# Patient Record
Sex: MALE | Race: WHITE | Employment: UNEMPLOYED | URBAN - METROPOLITAN AREA
[De-identification: names, ages, dates, MRNs, and addresses within clinical notes are randomized per-mention and may not be internally consistent; named-entity substitution may affect disease eponyms.]

---

## 2021-02-15 ENCOUNTER — OFFICE VISIT (OUTPATIENT)
Dept: INFECTIOUS DISEASES | Age: 36
End: 2021-02-15
Payer: MEDICARE

## 2021-02-15 VITALS
TEMPERATURE: 98.6 F | HEART RATE: 81 BPM | DIASTOLIC BLOOD PRESSURE: 87 MMHG | HEIGHT: 69 IN | OXYGEN SATURATION: 99 % | SYSTOLIC BLOOD PRESSURE: 137 MMHG | WEIGHT: 139.6 LBS | BODY MASS INDEX: 20.68 KG/M2 | RESPIRATION RATE: 20 BRPM

## 2021-02-15 DIAGNOSIS — A69.20 LYME DISEASE: Primary | ICD-10-CM

## 2021-02-15 PROCEDURE — G8484 FLU IMMUNIZE NO ADMIN: HCPCS | Performed by: INTERNAL MEDICINE

## 2021-02-15 PROCEDURE — G8427 DOCREV CUR MEDS BY ELIG CLIN: HCPCS | Performed by: INTERNAL MEDICINE

## 2021-02-15 PROCEDURE — 99204 OFFICE O/P NEW MOD 45 MIN: CPT | Performed by: INTERNAL MEDICINE

## 2021-02-15 PROCEDURE — G8420 CALC BMI NORM PARAMETERS: HCPCS | Performed by: INTERNAL MEDICINE

## 2021-02-15 PROCEDURE — 1036F TOBACCO NON-USER: CPT | Performed by: INTERNAL MEDICINE

## 2021-02-15 RX ORDER — AMOXICILLIN 500 MG/1
500 CAPSULE ORAL 3 TIMES DAILY
Qty: 63 CAPSULE | Refills: 0 | Status: SHIPPED | OUTPATIENT
Start: 2021-02-15 | End: 2021-03-01

## 2021-02-15 RX ORDER — ALBUTEROL SULFATE 90 UG/1
AEROSOL, METERED RESPIRATORY (INHALATION)
COMMUNITY
Start: 2020-04-05

## 2021-02-15 RX ORDER — GUAIFENESIN 600 MG/1
TABLET, EXTENDED RELEASE ORAL
COMMUNITY
Start: 2020-12-18

## 2021-02-15 RX ORDER — ALPRAZOLAM 1 MG/1
1 TABLET ORAL
COMMUNITY
Start: 2020-10-02

## 2021-02-15 RX ORDER — SOLIFENACIN SUCCINATE 5 MG/1
TABLET, FILM COATED ORAL
COMMUNITY
Start: 2020-10-02 | End: 2021-08-09

## 2021-02-15 RX ORDER — POLYETHYLENE GLYCOL 3350 17 G/17G
POWDER, FOR SOLUTION ORAL
COMMUNITY
Start: 2020-11-04

## 2021-02-15 RX ORDER — PANTOPRAZOLE SODIUM 40 MG/1
TABLET, DELAYED RELEASE ORAL
COMMUNITY

## 2021-02-15 RX ORDER — PYRIDOSTIGMINE BROMIDE 60 MG/1
TABLET ORAL
COMMUNITY
Start: 2020-07-21

## 2021-02-15 RX ORDER — BUPROPION HYDROCHLORIDE 150 MG/1
150 TABLET, EXTENDED RELEASE ORAL
COMMUNITY
Start: 2020-09-17

## 2021-02-15 RX ORDER — GABAPENTIN 600 MG/1
600 TABLET ORAL 3 TIMES DAILY
COMMUNITY
Start: 2021-01-29

## 2021-02-15 RX ORDER — TAMSULOSIN HYDROCHLORIDE 0.4 MG/1
CAPSULE ORAL
COMMUNITY
Start: 2020-07-22

## 2021-02-15 ASSESSMENT — ENCOUNTER SYMPTOMS
EYE ITCHING: 0
COLOR CHANGE: 0
ABDOMINAL PAIN: 0
APNEA: 0

## 2021-02-15 NOTE — PROGRESS NOTES
[facility] - Initial Consult Note  Today's Date: 2/15/2021    Impression :   2/15/21 :  · Lyme disease by Pos titers  1/4/2021  · Exposed to Deer, no tick or pamela hx- fatigue increased x 2 mon, WB IGG and SUKHDEEP IGM- ?? Real  Or false - never treated -  · 2/15/21 amoxicillin 21 days  · HOLLOWAY disease 2019 - pacemaker - - labile BP - weak - fogginess - fatigue - wt loss 20 pds    Recommendations   · Amoxicillin 21 days   · Repeat lyme in 45 days  · See me 45 days   Diagnosis Orders   1. Lyme disease  amoxicillin (AMOXIL) 500 MG capsule    Lyme Ab    Lyme Disease, Western Blot       Return in about 6 weeks (around 3/29/2021). History of Present Illness:   Munira Payton is a 38y.o.-year-old  male who presents with   Chief Complaint   Patient presents with    New Patient     stated hasnt noticed any rash doesnt know how he got lyme disease   visit new pt -2/15/21  · Lyme disease by Pos titers  1/4/2021  · Exposed to Deer, no tick or pamela hx- fatigue increased x 2 mon, WB IGG and SUKHDEEP IGM- ?? Real  Or false - never treated -  · 2/15/21 amoxicillin 21 days  · HOLLOWAY disease 2019 - pacemaker - - labile BP - weak - fogginess - fatigue - wt loss 20 pds    No neck pain or HA or joint pains   No heart murmur    Plan:  · Amoxicillin 21 days   · Repeat lyme in 45 days  · See me 45 days      I have personally reviewed the past medical history, past surgical history, medications, social history, and family history, and I haveupdated the database accordingly.   Past Medical History:     Past Medical History:   Diagnosis Date    Asthma     Depression     Lyme disease     Rectal bleeding        Past Surgical  History:     Past Surgical History:   Procedure Laterality Date    APPENDECTOMY      GALLBLADDER SURGERY      HERNIA REPAIR      HERNIA REPAIR      PACEMAKER CHANGE         Medications:     Current Outpatient Medications:     ALPRAZolam (XANAX) 1 MG tablet, Take 1 mg by mouth., Disp: , Rfl:     buPROPion (WELLBUTRIN SR) 150 MG extended release tablet, Take 150 mg by mouth, Disp: , Rfl:     pyridostigmine (MESTINON) 60 MG tablet, pyridostigmine bromide 60 mg tablet  TAKE 2 TABLETS BY MOUTH THREE TIMES A DAY, Disp: , Rfl:     pantoprazole (PROTONIX) 40 MG tablet, pantoprazole 40 mg tablet,delayed release, Disp: , Rfl:     solifenacin (VESICARE) 5 MG tablet, solifenacin 5 mg tablet, Disp: , Rfl:     tamsulosin (FLOMAX) 0.4 MG capsule, tamsulosin 0.4 mg capsule  Take 1 capsule every day by oral route for 90 days. , Disp: , Rfl:     trimethobenzamide (TIGAN) 300 MG capsule, Tigan 300 mg capsule  Take 1 capsule every 8 hours by oral route as needed. , Disp: , Rfl:     polyethylene glycol (GLYCOLAX) 17 GM/SCOOP powder, polyethylene glycol 3350 17 gram oral powder packet, Disp: , Rfl:     gabapentin (NEURONTIN) 600 MG tablet, Take 600 mg by mouth 3 times daily. , Disp: , Rfl:     albuterol sulfate  (90 Base) MCG/ACT inhaler, albuterol sulfate HFA 90 mcg/actuation aerosol inhaler  PRn, Disp: , Rfl:     guaiFENesin (MUCINEX) 600 MG extended release tablet, Mucinex 600 mg tablet, extended release, Disp: , Rfl:     amoxicillin (AMOXIL) 500 MG capsule, Take 1 capsule by mouth 3 times daily for 21 days, Disp: 63 capsule, Rfl: 0    polyethylene glycol (GOLYTELY) 236 G solution, Use as directed for bowel prep, Disp: 4000 mL, Rfl: 0    docusate sodium (COLACE) 100 MG capsule, Take 1 capsule by mouth 2 times daily as needed for Constipation. , Disp: 60 capsule, Rfl: 3    amitriptyline (ELAVIL) 25 MG tablet, Take 25 mg by mouth nightly., Disp: , Rfl:       Social History:     Social History     Socioeconomic History    Marital status: Single     Spouse name: Not on file    Number of children: Not on file    Years of education: Not on file    Highest education level: Not on file   Occupational History    Not on file   Social Needs    Financial resource strain: Not on file   [place] insecurity     Worry: Not on file     Inability: Not on file    Transportation needs     Medical: Not on file     Non-medical: Not on file   Tobacco Use    Smoking status: Former Smoker     Packs/day: 1.00     Types: Cigarettes     Quit date: 5/15/2020     Years since quittin.7    Smokeless tobacco: Never Used   Substance and Sexual Activity    Alcohol use: Yes     Alcohol/week: 0.0 standard drinks     Comment: weekend drinking 12 pack at a time    Drug use: No    Sexual activity: Not on file   Lifestyle    Physical activity     Days per week: Not on file     Minutes per session: Not on file    Stress: Not on file   Relationships    Social connections     Talks on phone: Not on file     Gets together: Not on file     Attends Spiritism service: Not on file     Active member of club or organization: Not on file     Attends meetings of clubs or organizations: Not on file     Relationship status: Not on file    Intimate partner violence     Fear of current or ex partner: Not on file     Emotionally abused: Not on file     Physically abused: Not on file     Forced sexual activity: Not on file   Other Topics Concern    Not on file   Social History Narrative    Not on file       Family History:     Family History   Problem Relation Age of Onset    Kidney Disease Mother         Allergies:   Diltiazem, Ondansetron hcl, and Zofran  [ondansetron]     Review of Systems:   Review of Systems   Constitutional: Positive for fatigue. Negative for activity change, appetite change and fever. HENT: Negative for congestion. Eyes: Negative for itching. Respiratory: Negative for apnea. Cardiovascular: Negative for chest pain. Gastrointestinal: Negative for abdominal pain. Endocrine: Negative for heat intolerance. Genitourinary: Negative for dysuria. Musculoskeletal: Negative for arthralgias. Skin: Negative for color change. Allergic/Immunologic: Negative for immunocompromised state.    Neurological: to participate in the care of this patient. Please call with questions. Freddie Quiros MD  - Office: [phone]    Please note that this chart was generated using voice recognition Dragon dictation software. Although every effort was made to ensure the accuracy of this automated transcription, some errors in transcription mayhave occurred.

## 2021-02-17 ENCOUNTER — HOSPITAL ENCOUNTER (OUTPATIENT)
Age: 36
Discharge: HOME | End: 2021-02-17
Payer: MEDICARE

## 2021-02-17 DIAGNOSIS — A69.20 LYME DISEASE: ICD-10-CM

## 2021-02-17 PROCEDURE — 86618 LYME DISEASE ANTIBODY: CPT

## 2021-02-17 PROCEDURE — 36415 COLL VENOUS BLD VENIPUNCTURE: CPT

## 2021-02-17 PROCEDURE — 86617 LYME DISEASE ANTIBODY: CPT

## 2021-02-18 LAB — LYME ANTIBODY: 0.98

## 2021-02-20 LAB
LYME IGM WB: NEGATIVE
LYME WESTERN BLOT IGG: POSITIVE

## 2021-02-24 ENCOUNTER — TELEPHONE (OUTPATIENT)
Dept: INFECTIOUS DISEASES | Age: 36
End: 2021-02-24

## 2021-02-24 DIAGNOSIS — A69.23 LYME ARTHRITIS (HCC): Primary | ICD-10-CM

## 2021-02-24 NOTE — TELEPHONE ENCOUNTER
Lázaro Sterling MD 2021 9:14 AM Good Morning Dr. Baldemar Zayas, your pt Krista Zaldivar : 1985 you seen for lymes disease and started him on amoxicillin 500mg TID which pt has been taking is calling today C/O joint pain which started a week and 1/2 ago right after he seen you on 2/15/2021 his pain is an \"8\" . here is your impression and plan. Please advise Tashia Gray  [facility] - Initial Consult Note  Today's Date: 2/15/2021    Impression :  2/15/21 :  · Lyme disease by Pos titers 2021  ? Exposed to Deer, no tick or pamela hx- fatigue increased x 2 mon, WB IGG and SUKHDEEP IGM- ?? Real Or false - never treated -  ? 2/15/21 amoxicillin 21 days  · HOLLOWAY disease 2019 - pacemaker - - labile BP - weak - fogginess - fatigue - wt loss 20 pds    Recommendations  · Amoxicillin 21 days   · Repeat lyme in 45 days  · See me 45 days  Diagnosis Orders  1. Lyme disease amoxicillin (AMOXIL) 500 MG capsule  Lyme Ab  Lyme Disease, Western Blot Read 2021 9:14 AM     2021 10:13 AM Kinsey pls see if he is willing to have a midline. And switch AB to Iv ceftriaxone 2 g daily x 14 days in order to better address the joint issue. Confirm he is not allergic. I also would like an EKG 2021 10:17 AM     spoke with pt he is willing to do midline and IV meds. please place the orders for midline, iv meds and ekg then I will set I all up. thank you Read 2021 10:29 AM     2021 10:29 AM K. In a bit.    Remind me in the office pls 2021 10:36 AM ok     2021 11:36 AM Done 2021 12:01 PM I don't see an order for EKG if you want the pt to have one Read 2021 12:17 PM 2021 12:18 PM Done

## 2021-02-25 ENCOUNTER — HOSPITAL ENCOUNTER (OUTPATIENT)
Dept: ONCOLOGY | Age: 36
Discharge: HOME | End: 2021-02-25
Attending: INTERNAL MEDICINE | Admitting: INTERNAL MEDICINE
Payer: MEDICARE

## 2021-02-25 VITALS
RESPIRATION RATE: 18 BRPM | SYSTOLIC BLOOD PRESSURE: 126 MMHG | HEART RATE: 80 BPM | DIASTOLIC BLOOD PRESSURE: 78 MMHG | OXYGEN SATURATION: 99 % | TEMPERATURE: 97 F

## 2021-02-25 DIAGNOSIS — A69.23 LYME ARTHRITIS (HCC): ICD-10-CM

## 2021-02-25 PROCEDURE — 96365 THER/PROPH/DIAG IV INF INIT: CPT

## 2021-02-25 PROCEDURE — 2580000003 HC RX 258: Performed by: INTERNAL MEDICINE

## 2021-02-25 PROCEDURE — 96375 TX/PRO/DX INJ NEW DRUG ADDON: CPT

## 2021-02-25 PROCEDURE — C1751 CATH, INF, PER/CENT/MIDLINE: HCPCS

## 2021-02-25 PROCEDURE — 36569 INSJ PICC 5 YR+ W/O IMAGING: CPT

## 2021-02-25 PROCEDURE — 6360000002 HC RX W HCPCS: Performed by: INTERNAL MEDICINE

## 2021-02-25 PROCEDURE — 76937 US GUIDE VASCULAR ACCESS: CPT

## 2021-02-25 PROCEDURE — 6360000002 HC RX W HCPCS

## 2021-02-25 RX ORDER — DOXYCYCLINE HYCLATE 100 MG/1
100 CAPSULE ORAL 2 TIMES DAILY
Qty: 56 CAPSULE | Refills: 0 | Status: SHIPPED | OUTPATIENT
Start: 2021-02-25 | End: 2021-03-10

## 2021-02-25 RX ORDER — DIPHENHYDRAMINE HYDROCHLORIDE 50 MG/ML
50 INJECTION INTRAMUSCULAR; INTRAVENOUS ONCE
Status: DISCONTINUED | OUTPATIENT
Start: 2021-02-25 | End: 2021-02-25 | Stop reason: HOSPADM

## 2021-02-25 RX ORDER — METHYLPREDNISOLONE SODIUM SUCCINATE 40 MG/ML
40 INJECTION, POWDER, LYOPHILIZED, FOR SOLUTION INTRAMUSCULAR; INTRAVENOUS ONCE
Status: COMPLETED | OUTPATIENT
Start: 2021-02-25 | End: 2021-02-25

## 2021-02-25 RX ORDER — DIPHENHYDRAMINE HYDROCHLORIDE 50 MG/ML
INJECTION INTRAMUSCULAR; INTRAVENOUS
Status: COMPLETED
Start: 2021-02-25 | End: 2021-02-25

## 2021-02-25 RX ADMIN — METHYLPREDNISOLONE SODIUM SUCCINATE 40 MG: 40 INJECTION, POWDER, FOR SOLUTION INTRAMUSCULAR; INTRAVENOUS at 16:02

## 2021-02-25 RX ADMIN — CEFTRIAXONE SODIUM 2000 MG: 2 INJECTION, POWDER, FOR SOLUTION INTRAMUSCULAR; INTRAVENOUS at 15:12

## 2021-02-25 RX ADMIN — DIPHENHYDRAMINE HYDROCHLORIDE 50 MG: 50 INJECTION, SOLUTION INTRAMUSCULAR; INTRAVENOUS at 15:55

## 2021-02-25 NOTE — PROGRESS NOTES
Patient feeling better. Will d/c midline and discharge. No rash or hives present at this time. May take oral benadryl if mild symptoms per Dr Sravani Unger.   [address] [facility]

## 2021-02-25 NOTE — PROCEDURES
Product type Arrowguard Blue Advance Midline   History/Labs/Allergies Reviewed  Placed By Juliet Carson RN   Assisted By N/A  Time out Performed using Two Identifiers  Lot #  [id number]  Expiration date 9/30/2021  Catheter size 4.5  Swiss  Trimmed at 15 cm  Total length 16 cm  External catheter length 0 cm  Location R BV  Number of attempts 1  Estimated blood loss 1 ml  Placement verified by- positive blood return & flushes easily  Special equipment used- ultrasound & micro-introducer technique   Catheter secured with statlock  Dressing applied- Tegaderm CHG  Lidocaine administered intradermally conc.1% 1 mL  Midline education:   [ X ] Discussed with patient/Family or POA prior to procedure. Risks and Benefits along with reason for procedure were discussed and teaching was reinforced with an education handout on Midline insertion. [facility] FAQ Catheter Associated Blood Stream Infections and [facility] [id number] REV. 7/13 Nursing and Booklet left at bedside or in chart. Patient (Family or POA) acknowledged understanding of information taught and agreed to procedure. [  ] Was not discussed with patient/family or POA due to pts medical status at time of procedure. pts family or POA not available to discuss Midline education.  [facility] FAQ Catheter Associated Blood Stream Infections and [facility] 86811 REV. 7/13 Nursing and Booklet left at bedside or in chart

## 2021-02-25 NOTE — PROGRESS NOTES
Patient called out that he is extremely itchy and red rash appearance on arms, stomach and legs. Some hives on wrist area. Antibiotic Stopped and Dr. Sukh James notified. Saline started and IV benadyl and solumedrol given. Will continue to monitor.

## 2021-02-25 NOTE — TELEPHONE ENCOUNTER
Pt is set up with mid line at [facility]. First dose at [facility]. [place] is going to be IV service P/O Josee at [facility] and home care. Pt informed.

## 2021-02-26 ENCOUNTER — TELEPHONE (OUTPATIENT)
Dept: INFECTIOUS DISEASES | Age: 36
End: 2021-02-26

## 2021-03-01 ENCOUNTER — OFFICE VISIT (OUTPATIENT)
Dept: INFECTIOUS DISEASES | Age: 36
End: 2021-03-01
Payer: MEDICARE

## 2021-03-01 ENCOUNTER — HOSPITAL ENCOUNTER (OUTPATIENT)
Age: 36
Discharge: HOME | End: 2021-03-01
Payer: MEDICARE

## 2021-03-01 ENCOUNTER — TELEPHONE (OUTPATIENT)
Dept: INFECTIOUS DISEASES | Age: 36
End: 2021-03-01

## 2021-03-01 VITALS
TEMPERATURE: 98 F | WEIGHT: 143 LBS | HEART RATE: 81 BPM | RESPIRATION RATE: 15 BRPM | DIASTOLIC BLOOD PRESSURE: 87 MMHG | HEIGHT: 69 IN | SYSTOLIC BLOOD PRESSURE: 129 MMHG | BODY MASS INDEX: 21.18 KG/M2

## 2021-03-01 DIAGNOSIS — A69.23 LYME ARTHRITIS (HCC): Primary | ICD-10-CM

## 2021-03-01 DIAGNOSIS — M25.562 ARTHRALGIA OF BOTH KNEES: ICD-10-CM

## 2021-03-01 DIAGNOSIS — M25.561 ARTHRALGIA OF BOTH KNEES: ICD-10-CM

## 2021-03-01 DIAGNOSIS — A69.23 LYME ARTHRITIS (HCC): ICD-10-CM

## 2021-03-01 LAB
C-REACTIVE PROTEIN: 3.8 MG/L (ref 0–5)
HIV AG/AB: NONREACTIVE
RHEUMATOID FACTOR: <10 IU/ML
SEDIMENTATION RATE, ERYTHROCYTE: 1 MM (ref 0–15)
T. PALLIDUM, IGG: NONREACTIVE

## 2021-03-01 PROCEDURE — 99214 OFFICE O/P EST MOD 30 MIN: CPT | Performed by: INTERNAL MEDICINE

## 2021-03-01 PROCEDURE — 87591 N.GONORRHOEAE DNA AMP PROB: CPT

## 2021-03-01 PROCEDURE — 87491 CHLMYD TRACH DNA AMP PROBE: CPT

## 2021-03-01 PROCEDURE — 86038 ANTINUCLEAR ANTIBODIES: CPT

## 2021-03-01 PROCEDURE — 86780 TREPONEMA PALLIDUM: CPT

## 2021-03-01 PROCEDURE — 87389 HIV-1 AG W/HIV-1&-2 AB AG IA: CPT

## 2021-03-01 PROCEDURE — 85652 RBC SED RATE AUTOMATED: CPT

## 2021-03-01 PROCEDURE — 36415 COLL VENOUS BLD VENIPUNCTURE: CPT

## 2021-03-01 PROCEDURE — 1036F TOBACCO NON-USER: CPT | Performed by: INTERNAL MEDICINE

## 2021-03-01 PROCEDURE — G8420 CALC BMI NORM PARAMETERS: HCPCS | Performed by: INTERNAL MEDICINE

## 2021-03-01 PROCEDURE — G8427 DOCREV CUR MEDS BY ELIG CLIN: HCPCS | Performed by: INTERNAL MEDICINE

## 2021-03-01 PROCEDURE — G8484 FLU IMMUNIZE NO ADMIN: HCPCS | Performed by: INTERNAL MEDICINE

## 2021-03-01 PROCEDURE — 87040 BLOOD CULTURE FOR BACTERIA: CPT

## 2021-03-01 PROCEDURE — 86431 RHEUMATOID FACTOR QUANT: CPT

## 2021-03-01 PROCEDURE — 86140 C-REACTIVE PROTEIN: CPT

## 2021-03-01 RX ORDER — IBUPROFEN 600 MG/1
600 TABLET ORAL 3 TIMES DAILY PRN
Qty: 90 TABLET | Refills: 0 | Status: SHIPPED | OUTPATIENT
Start: 2021-03-01 | End: 2021-08-09

## 2021-03-01 ASSESSMENT — ENCOUNTER SYMPTOMS
CHOKING: 1
COLOR CHANGE: 0
APNEA: 0
ABDOMINAL PAIN: 0
EYE ITCHING: 0

## 2021-03-01 NOTE — PATIENT INSTRUCTIONS
Called Diana at [facility] she will take care of midline, ABX and home care. Called Dr. Gerhardt Holstein office they asked I fax over, notes, facesheet and referral and they will ask doctor if pt can get in ASAP I asked to be within a week if possible. I faxed to Dr. Gerhardt Holstein at [phone]. Patient will get labs done at [facility].  Copy of referral was given to patient to call Dr. Gerhardt Holstein office this Wednesday, if he can't get in before appt with us 3-10-21 he will call us back to reschedule his appt with Dr. Ilda Mckay.  DLR

## 2021-03-01 NOTE — TELEPHONE ENCOUNTER
Nafisa Nixon MD 3/1/2021 8:57 AM Angela Solis, : [birth year]. Pt called complaining of terrible joint pain, can barely walk, wonders if there is something other than Tylenol to take? Damari Whatley Read 3/1/2021 9:04 AM 3/1/2021 9:05 AM Come to see me today pls let n office    Called patient, scheduled.

## 2021-03-01 NOTE — PROGRESS NOTES
fogginess - fatigue - wt loss 20 pds    No neck pain or HA or joint pains   No heart murmur    Plan:  · Amoxicillin 21 days   · Repeat lyme in 45 days  · See me 45 days    Visit 3/1/21  Did not do well on the amox and started w sevree diffuse joints pain - sent to infusion for  ceftriaxone and had a rash after first does  w hives - prednisolone made him betetr and sent out on doxy  Po -since 2/25. But called w severe progressive generalized large joint pains, no acute infl or arthritis, no past similar hx -    lost lot of wt recently    Plan:  Try meropenem 2 weeks  DESOUZA for rheum   Ask Rheum to see very soon for alternative diag  Get HIV test  Get VDRL  And GC chlamidia          I have personally reviewed the past medical history, past surgical history, medications, social history, and family history, and I haveupdated the database accordingly.   Past Medical History:     Past Medical History:   Diagnosis Date    Asthma     Depression     Lyme disease     Rectal bleeding        Past Surgical  History:     Past Surgical History:   Procedure Laterality Date    APPENDECTOMY      GALLBLADDER SURGERY      HERNIA REPAIR      HERNIA REPAIR      INSERT MIDLINE CATHETER  2/25/2021         PACEMAKER CHANGE         Medications:     Current Outpatient Medications:     ibuprofen (ADVIL;MOTRIN) 600 MG tablet, Take 1 tablet by mouth 3 times daily as needed for Pain, Disp: 90 tablet, Rfl: 0    meropenem (MERREM) infusion, Infuse 1,000 mg intravenously every 8 hours for 14 days First dose in the infusion center and see if tolerates Compound per protocol., Disp: 1 each, Rfl: 0    doxycycline hyclate (VIBRAMYCIN) 100 MG capsule, Take 1 capsule by mouth 2 times daily for 28 days, Disp: 56 capsule, Rfl: 0    ALPRAZolam (XANAX) 1 MG tablet, Take 1 mg by mouth., Disp: , Rfl:     buPROPion (WELLBUTRIN SR) 150 MG extended release tablet, Take 150 mg by mouth, Disp: , Rfl:     pyridostigmine (MESTINON) 60 MG tablet, pyridostigmine bromide 60 mg tablet  TAKE 2 TABLETS BY MOUTH THREE TIMES A DAY, Disp: , Rfl:     pantoprazole (PROTONIX) 40 MG tablet, pantoprazole 40 mg tablet,delayed release, Disp: , Rfl:     solifenacin (VESICARE) 5 MG tablet, solifenacin 5 mg tablet, Disp: , Rfl:     tamsulosin (FLOMAX) 0.4 MG capsule, tamsulosin 0.4 mg capsule  Take 1 capsule every day by oral route for 90 days. , Disp: , Rfl:     trimethobenzamide (TIGAN) 300 MG capsule, Tigan 300 mg capsule  Take 1 capsule every 8 hours by oral route as needed. , Disp: , Rfl:     polyethylene glycol (GLYCOLAX) 17 GM/SCOOP powder, polyethylene glycol 3350 17 gram oral powder packet, Disp: , Rfl:     gabapentin (NEURONTIN) 600 MG tablet, Take 600 mg by mouth 3 times daily. , Disp: , Rfl:     albuterol sulfate  (90 Base) MCG/ACT inhaler, albuterol sulfate HFA 90 mcg/actuation aerosol inhaler  PRn, Disp: , Rfl:     guaiFENesin (MUCINEX) 600 MG extended release tablet, Mucinex 600 mg tablet, extended release, Disp: , Rfl:     polyethylene glycol (GOLYTELY) 236 G solution, Use as directed for bowel prep, Disp: 4000 mL, Rfl: 0      Social History:     Social History     Socioeconomic History    Marital status: Single     Spouse name: Not on file    Number of children: Not on file    Years of education: Not on file    Highest education level: Not on file   Occupational History    Not on file   Social Needs    Financial resource strain: Not on file    Food insecurity     Worry: Not on file     Inability: Not on file    Transportation needs     Medical: Not on file     Non-medical: Not on file   Tobacco Use    Smoking status: Former Smoker     Packs/day: 1.00     Types: Cigarettes     Quit date: 5/15/2020     Years since quittin.7    Smokeless tobacco: Never Used   Substance and Sexual Activity    Alcohol use:  Yes     Alcohol/week: 0.0 standard drinks     Comment: weekend drinking 12 pack at a time    Drug use: No    Sexual activity: Not on file   Lifestyle    Physical activity     Days per week: Not on file     Minutes per session: Not on file    Stress: Not on file   Relationships    Social connections     Talks on phone: Not on file     Gets together: Not on file     Attends Jew service: Not on file     Active member of club or organization: Not on file     Attends meetings of clubs or organizations: Not on file     Relationship status: Not on file    Intimate partner violence     Fear of current or ex partner: Not on file     Emotionally abused: Not on file     Physically abused: Not on file     Forced sexual activity: Not on file   Other Topics Concern    Not on file   Social History Narrative    Not on file       Family History:     Family History   Problem Relation Age of Onset    Kidney Disease Mother         Allergies:   Rocephin [ceftriaxone], Diltiazem, Ondansetron hcl, and Zofran  [ondansetron]     Review of Systems:   Review of Systems   Constitutional: Negative for activity change, appetite change, fatigue and fever. HENT: Negative for congestion. Eyes: Negative for itching. Respiratory: Positive for choking. Negative for apnea. Cardiovascular: Negative for chest pain. Gastrointestinal: Negative for abdominal pain. Endocrine: Negative for heat intolerance. Genitourinary: Negative for dysuria. Musculoskeletal: Positive for arthralgias (diffuse large and small). Skin: Negative for color change. Allergic/Immunologic: Negative for immunocompromised state. Neurological: Negative for dizziness and headaches. Hematological: Negative for adenopathy. Psychiatric/Behavioral: Negative for agitation. Fogginess       Physical Examination :   Blood pressure 129/87, pulse 81, temperature 98 °F (36.7 °C), temperature source Temporal, resp. rate 15, height 5' 9\" (1.753 m), weight 143 lb (64.9 kg). Physical Exam  Constitutional:       General: He is not in acute distress.      Appearance: Normal accuracy of this automated transcription, some errors in transcription mayhave occurred.

## 2021-03-02 LAB
ANTI-NUCLEAR ANTIBODY (ANA): NEGATIVE
C. TRACHOMATIS DNA ,URINE: NEGATIVE
N. GONORRHOEAE DNA, URINE: NEGATIVE
SPECIMEN DESCRIPTION: NORMAL

## 2021-03-03 ENCOUNTER — HOSPITAL ENCOUNTER (OUTPATIENT)
Dept: INTERVENTIONAL RADIOLOGY/VASCULAR | Age: 36
Discharge: HOME | End: 2021-03-05
Payer: MEDICARE

## 2021-03-03 ENCOUNTER — HOSPITAL ENCOUNTER (OUTPATIENT)
Dept: ONCOLOGY | Age: 36
Discharge: HOME | End: 2021-03-03
Attending: INTERNAL MEDICINE | Admitting: INTERNAL MEDICINE
Payer: MEDICARE

## 2021-03-03 VITALS
HEART RATE: 83 BPM | SYSTOLIC BLOOD PRESSURE: 127 MMHG | OXYGEN SATURATION: 100 % | RESPIRATION RATE: 20 BRPM | DIASTOLIC BLOOD PRESSURE: 88 MMHG | TEMPERATURE: 98.4 F

## 2021-03-03 DIAGNOSIS — A69.23 LYME ARTHRITIS (HCC): ICD-10-CM

## 2021-03-03 DIAGNOSIS — M25.561 ARTHRALGIA OF BOTH KNEES: ICD-10-CM

## 2021-03-03 DIAGNOSIS — M25.562 ARTHRALGIA OF BOTH KNEES: ICD-10-CM

## 2021-03-03 PROCEDURE — 2580000003 HC RX 258: Performed by: INTERNAL MEDICINE

## 2021-03-03 PROCEDURE — 2709999900 HC NON-CHARGEABLE SUPPLY

## 2021-03-03 PROCEDURE — 6360000002 HC RX W HCPCS: Performed by: INTERNAL MEDICINE

## 2021-03-03 PROCEDURE — 96365 THER/PROPH/DIAG IV INF INIT: CPT

## 2021-03-03 PROCEDURE — 76000 FLUOROSCOPY <1 HR PHYS/QHP: CPT

## 2021-03-03 PROCEDURE — C1751 CATH, INF, PER/CENT/MIDLINE: HCPCS

## 2021-03-03 PROCEDURE — 36410 VNPNXR 3YR/> PHY/QHP DX/THER: CPT

## 2021-03-03 RX ORDER — DIPHENHYDRAMINE HYDROCHLORIDE 50 MG/ML
25 INJECTION INTRAMUSCULAR; INTRAVENOUS
Status: DISCONTINUED | OUTPATIENT
Start: 2021-03-03 | End: 2021-03-03 | Stop reason: HOSPADM

## 2021-03-03 RX ORDER — DIPHENHYDRAMINE HYDROCHLORIDE 50 MG/ML
25 INJECTION INTRAMUSCULAR; INTRAVENOUS
Status: CANCELLED | OUTPATIENT
Start: 2021-03-03 | End: 2021-03-03

## 2021-03-03 RX ADMIN — MEROPENEM 1000 MG: 1 INJECTION, POWDER, FOR SOLUTION INTRAVENOUS at 11:25

## 2021-03-03 NOTE — PROGRESS NOTES
Pharmacy called regarding iv meropenem allergy warning in [facility]. Per [place] pharmacist this is an inaccurate warning and may be given. This medication (Meropenem) is usually used with patients with allergies to cephalosporins.

## 2021-03-03 NOTE — PROGRESS NOTES
Pt arrives to IR for midline placement  KT RT and J Redfox PA to room  Site prepped and draped  Access obtained and 4F single lumen placed to Rt basilic without difficulty  Arm circ at insertion site 27cm  Tolerated well  To 3c for infusion

## 2021-03-03 NOTE — PROGRESS NOTES
IV antibiotic completed,tolerated well. Line flushed with Normal saline,line clamped and capped. Patient discharged from unit per ambulatory with steady gait.

## 2021-03-05 ENCOUNTER — TELEPHONE (OUTPATIENT)
Dept: INFECTIOUS DISEASES | Age: 36
End: 2021-03-05

## 2021-03-07 LAB
CULTURE: NORMAL
CULTURE: NORMAL
Lab: NORMAL
Lab: NORMAL
SPECIMEN DESCRIPTION: NORMAL
SPECIMEN DESCRIPTION: NORMAL

## 2021-03-08 ENCOUNTER — TELEPHONE (OUTPATIENT)
Dept: PULMONOLOGY | Age: 36
End: 2021-03-08

## 2021-03-08 NOTE — TELEPHONE ENCOUNTER
Patient called and stated that he called Dr. Mikell Cushing office and they said they did not get my faxes. I called them myself and spoke to [facility]. She said they did get it Friday and they are short staffed if patient can call them Wednesday and they will make him an appt.

## 2021-03-10 ENCOUNTER — OFFICE VISIT (OUTPATIENT)
Dept: INFECTIOUS DISEASES | Age: 36
End: 2021-03-10
Payer: MEDICARE

## 2021-03-10 VITALS
WEIGHT: 143 LBS | HEART RATE: 80 BPM | RESPIRATION RATE: 18 BRPM | OXYGEN SATURATION: 100 % | TEMPERATURE: 97.1 F | SYSTOLIC BLOOD PRESSURE: 110 MMHG | BODY MASS INDEX: 21.18 KG/M2 | DIASTOLIC BLOOD PRESSURE: 74 MMHG | HEIGHT: 69 IN

## 2021-03-10 DIAGNOSIS — M79.10 MYALGIA: ICD-10-CM

## 2021-03-10 DIAGNOSIS — A69.23 LYME ARTHRITIS (HCC): Primary | ICD-10-CM

## 2021-03-10 DIAGNOSIS — M25.50 ARTHRALGIA, UNSPECIFIED JOINT: ICD-10-CM

## 2021-03-10 LAB
C-REACTIVE PROTEIN: NORMAL
CREAT SERPL-MCNC: NORMAL MG/DL

## 2021-03-10 PROCEDURE — G8420 CALC BMI NORM PARAMETERS: HCPCS | Performed by: INTERNAL MEDICINE

## 2021-03-10 PROCEDURE — G8484 FLU IMMUNIZE NO ADMIN: HCPCS | Performed by: INTERNAL MEDICINE

## 2021-03-10 PROCEDURE — 99214 OFFICE O/P EST MOD 30 MIN: CPT | Performed by: INTERNAL MEDICINE

## 2021-03-10 PROCEDURE — 1036F TOBACCO NON-USER: CPT | Performed by: INTERNAL MEDICINE

## 2021-03-10 PROCEDURE — G8427 DOCREV CUR MEDS BY ELIG CLIN: HCPCS | Performed by: INTERNAL MEDICINE

## 2021-03-10 ASSESSMENT — ENCOUNTER SYMPTOMS
APNEA: 0
CHOKING: 0
ABDOMINAL PAIN: 0
EYE ITCHING: 0
COLOR CHANGE: 0

## 2021-03-10 NOTE — PROGRESS NOTES
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 Diagnosis Orders   1. Lyme arthritis ([address]  EKG 12 lead    ECHO Complete 2D W Doppler W Color    CBC With Auto Differential    C-Reactive Protein    Creatinine, Serum   2. Myalgia  EKG 12 lead    ECHO Complete 2D W Doppler W Color    CBC With Auto Differential    C-Reactive Protein    Creatinine, Serum   3. Arthralgia, unspecified joint  EKG 12 lead    ECHO Complete 2D W Doppler W Color       Return in about 3 weeks (around 3/31/2021). History of Present Illness:   Dorothy Melara is a 38y.o.-year-old  male who presents with   Chief Complaint   Patient presents with    Lyme Disease     joint pain doing iv abx but once off his pain comes back    Muscle Pain   visit new pt -2/15/21  · Lyme disease by Pos titers  1/4/2021  · Exposed to [place], no tick or pamela hx- fatigue increased x 2 mon, lyme WB IGG and SUKHDEEP IGM- ?? 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 Burgdorferi IgG 0.02     Lyme IgG WBlot            Positive          A             NEGATIVE     Lyme IgG Bands            See below                            p93,p58,p45,p41,p39,p30,p23,p18  CDC criteria for a positive Western blot are the presence of  >= 5 bands for IgG. Lyme IgM WBlot            Negative                       NEGATIVE     Lyme IgM Bands            None      Visit 3/1/21  Did not do well on the amox and started w sevree diffuse joints pain - sent to infusion for  ceftriaxone and had a rash after first does  w hives - prednisolone made him betetr and sent out on doxy  Po -since 2/25. But called w severe progressive generalized large joint pains, no acute infl or arthritis, no past similar hx -    lost lot of wt recently    Plan:  Try meropenem 2 weeks  DESOUZA for rheum   Ask Rheum to see very soon for alternative diag  Get HIV test  Get VDRL  And GC chlamydia      Results for Irwin Dias (MRN [id number]) as of 3/10/2021 13:12   Ref. Range 2/17/2021 10:25   Lyme Ab Latest Ref Range: <0.91  0.98 (H)   Lyme IgG Wb Latest Ref Range: Negative  Positive   Lyme IgM Wb Latest Ref Range: Negative  Negative       Visit 3/10/21    HIV neg  T pallidium neg  RF neg  ESR 1  CRP 3.8  SUYAPA neg    GC chlamydia U PCR neg    Still has joint pains and no acute arthritis but arthralgias  Myalgias better  On NSAIDS  Sees Dr Franky Noel in few days    Today day 10 meropenem. Keep course x 3 more weeks till all pain gone, treating lyme arthritis where pt reacted to ceftriaxone. I have personally reviewed the past medical history, past surgical history, medications, social history, and family history, and I haveupdated the database accordingly.   Past Medical History:     Past Medical History:   Diagnosis Date    Asthma     Depression     Lyme disease     Rectal bleeding        Past Surgical  History:     Past Surgical History:   Procedure Laterality Date    APPENDECTOMY      GALLBLADDER SURGERY  HERNIA REPAIR      HERNIA REPAIR      INSERT MIDLINE CATHETER  2/25/2021         PACEMAKER CHANGE         Medications:     Current Outpatient Medications:     ibuprofen (ADVIL;MOTRIN) 600 MG tablet, Take 1 tablet by mouth 3 times daily as needed for Pain, Disp: 90 tablet, Rfl: 0    meropenem (MERREM) infusion, Infuse 1,000 mg intravenously every 8 hours for 14 days First dose in the infusion center and see if tolerates Compound per protocol., Disp: 1 each, Rfl: 0    ALPRAZolam (XANAX) 1 MG tablet, Take 1 mg by mouth., Disp: , Rfl:     buPROPion (WELLBUTRIN SR) 150 MG extended release tablet, Take 150 mg by mouth, Disp: , Rfl:     pyridostigmine (MESTINON) 60 MG tablet, pyridostigmine bromide 60 mg tablet  TAKE 2 TABLETS BY MOUTH THREE TIMES A DAY, Disp: , Rfl:     pantoprazole (PROTONIX) 40 MG tablet, pantoprazole 40 mg tablet,delayed release, Disp: , Rfl:     solifenacin (VESICARE) 5 MG tablet, solifenacin 5 mg tablet, Disp: , Rfl:     tamsulosin (FLOMAX) 0.4 MG capsule, tamsulosin 0.4 mg capsule  Take 1 capsule every day by oral route for 90 days. , Disp: , Rfl:     trimethobenzamide (TIGAN) 300 MG capsule, Tigan 300 mg capsule  Take 1 capsule every 8 hours by oral route as needed. , Disp: , Rfl:     polyethylene glycol (GLYCOLAX) 17 GM/SCOOP powder, polyethylene glycol 3350 17 gram oral powder packet, Disp: , Rfl:     gabapentin (NEURONTIN) 600 MG tablet, Take 600 mg by mouth 3 times daily. , Disp: , Rfl:     albuterol sulfate  (90 Base) MCG/ACT inhaler, albuterol sulfate HFA 90 mcg/actuation aerosol inhaler  PRn, Disp: , Rfl:     guaiFENesin (MUCINEX) 600 MG extended release tablet, Mucinex 600 mg tablet, extended release, Disp: , Rfl:     polyethylene glycol (GOLYTELY) 236 G solution, Use as directed for bowel prep, Disp: 4000 mL, Rfl: 0      Social History:     Social History     Socioeconomic History    Marital status: Single     Spouse name: Not on file    Number of children: Not on file    Years of education: Not on file    Highest education level: Not on file   Occupational History    Not on file   Social Needs    Financial resource strain: Not on file    Food insecurity     Worry: Not on file     Inability: Not on file    Transportation needs     Medical: Not on file     Non-medical: Not on file   Tobacco Use    Smoking status: Former Smoker     Packs/day: 1.00     Types: Cigarettes     Quit date: 5/15/2020     Years since quittin.8    Smokeless tobacco: Never Used   Substance and Sexual Activity    Alcohol use: Yes     Alcohol/week: 0.0 standard drinks     Comment: weekend drinking 12 pack at a time    Drug use: No    Sexual activity: Not on file   Lifestyle    Physical activity     Days per week: Not on file     Minutes per session: Not on file    Stress: Not on file   Relationships    Social connections     Talks on phone: Not on file     Gets together: Not on file     Attends Protestant service: Not on file     Active member of club or organization: Not on file     Attends meetings of clubs or organizations: Not on file     Relationship status: Not on file    Intimate partner violence     Fear of current or ex partner: Not on file     Emotionally abused: Not on file     Physically abused: Not on file     Forced sexual activity: Not on file   Other Topics Concern    Not on file   Social History Narrative    Not on file       Family History:     Family History   Problem Relation Age of Onset    Kidney Disease Mother         Allergies:   Rocephin [ceftriaxone], Diltiazem, Ondansetron hcl, and Zofran  [ondansetron]     Review of Systems:   Review of Systems   Constitutional: Negative for activity change, appetite change, fatigue and fever. HENT: Negative for congestion. Eyes: Negative for itching. Respiratory: Negative for apnea and choking. Cardiovascular: Negative for chest pain. Gastrointestinal: Negative for abdominal pain.    Endocrine: Negative for heat intolerance. Genitourinary: Negative for dysuria and flank pain. Musculoskeletal: Positive for arthralgias (diffuse large and small). Skin: Negative for color change. Allergic/Immunologic: Negative for immunocompromised state. Neurological: Negative for dizziness and headaches. Hematological: Negative for adenopathy. Psychiatric/Behavioral: Negative for agitation. Fogginess       Physical Examination :   Blood pressure 110/74, pulse 80, temperature 97.1 °F (36.2 °C), temperature source Temporal, resp. rate 18, height 5' 9\" (1.753 m), weight 143 lb (64.9 kg), SpO2 100 %. Physical Exam  Constitutional:       General: He is not in acute distress. Appearance: Normal appearance. He is not ill-appearing. HENT:      Head: Normocephalic and atraumatic. Nose: Nose normal.      Mouth/Throat:      Mouth: Mucous membranes are moist.   Eyes:      General: No scleral icterus. Neck:      Musculoskeletal: Neck supple. No neck rigidity or muscular tenderness. Cardiovascular:      Rate and Rhythm: Normal rate and regular rhythm. Heart sounds: Normal heart sounds. No murmur. Pulmonary:      Effort: No respiratory distress. Breath sounds: No wheezing. Abdominal:      General: There is no distension. Palpations: Abdomen is soft. Tenderness: There is no abdominal tenderness. Genitourinary:     Comments: No estes  Musculoskeletal:         General: No swelling, tenderness or deformity. Skin:     General: Skin is dry. Coloration: Skin is not jaundiced. Neurological:      General: No focal deficit present. Mental Status: He is alert and oriented to person, place, and time. Cranial Nerves: No cranial nerve deficit. Psychiatric:         Mood and Affect: Mood normal.         Thought Content:  Thought content normal.           Medical Decision Making:   I have independently reviewed/ordered the following labs:    CBCwith Differential: No results found for:

## 2021-03-10 NOTE — PATIENT INSTRUCTIONS
On 8/18/2021 report to [facility]. Main enterance of the bldg. Report at 9:15am. You can also have your EKG and labs done on the same day.

## 2021-03-12 DIAGNOSIS — M79.10 MYALGIA: ICD-10-CM

## 2021-03-12 DIAGNOSIS — A69.23 LYME ARTHRITIS (HCC): ICD-10-CM

## 2021-03-16 ENCOUNTER — TELEPHONE (OUTPATIENT)
Dept: INFECTIOUS DISEASES | Age: 36
End: 2021-03-16

## 2021-03-17 LAB
BASOPHILS ABSOLUTE: NORMAL
BASOPHILS RELATIVE PERCENT: NORMAL
C-REACTIVE PROTEIN: NORMAL
CREAT SERPL-MCNC: NORMAL MG/DL
EOSINOPHILS ABSOLUTE: NORMAL
EOSINOPHILS RELATIVE PERCENT: NORMAL
HCT VFR BLD CALC: NORMAL %
HEMOGLOBIN: NORMAL
LYMPHOCYTES ABSOLUTE: NORMAL
LYMPHOCYTES RELATIVE PERCENT: NORMAL
MCH RBC QN AUTO: NORMAL PG
MCHC RBC AUTO-ENTMCNC: NORMAL G/DL
MCV RBC AUTO: NORMAL FL
MONOCYTES ABSOLUTE: NORMAL
MONOCYTES RELATIVE PERCENT: NORMAL
NEUTROPHILS ABSOLUTE: NORMAL
NEUTROPHILS RELATIVE PERCENT: NORMAL
PDW BLD-RTO: NORMAL %
PLATELET # BLD: NORMAL 10*3/UL
PMV BLD AUTO: NORMAL FL
RBC # BLD: NORMAL 10*6/UL
WBC # BLD: NORMAL 10*3/UL

## 2021-03-18 ENCOUNTER — HOSPITAL ENCOUNTER (OUTPATIENT)
Dept: NON INVASIVE DIAGNOSTICS | Age: 36
Discharge: HOME | End: 2021-03-18
Payer: MEDICARE

## 2021-03-18 ENCOUNTER — HOSPITAL ENCOUNTER (OUTPATIENT)
Age: 36
Discharge: HOME | End: 2021-03-18
Payer: MEDICARE

## 2021-03-18 DIAGNOSIS — M79.10 MYALGIA: ICD-10-CM

## 2021-03-18 DIAGNOSIS — M25.50 ARTHRALGIA, UNSPECIFIED JOINT: ICD-10-CM

## 2021-03-18 DIAGNOSIS — A69.23 LYME ARTHRITIS (HCC): ICD-10-CM

## 2021-03-18 LAB
EKG ATRIAL RATE: 80 BPM
EKG P AXIS: 70 DEGREES
EKG P-R INTERVAL: 212 MS
EKG Q-T INTERVAL: 378 MS
EKG QRS DURATION: 92 MS
EKG QTC CALCULATION (BAZETT): 435 MS
EKG R AXIS: 7 DEGREES
EKG T AXIS: 38 DEGREES
EKG VENTRICULAR RATE: 80 BPM
LV EF: 50 %
LVEF MODALITY: NORMAL

## 2021-03-18 PROCEDURE — 93306 TTE W/DOPPLER COMPLETE: CPT

## 2021-03-18 PROCEDURE — 93010 ELECTROCARDIOGRAM REPORT: CPT | Performed by: INTERNAL MEDICINE

## 2021-03-18 PROCEDURE — 93005 ELECTROCARDIOGRAM TRACING: CPT | Performed by: INTERNAL MEDICINE

## 2021-03-22 ENCOUNTER — TELEPHONE (OUTPATIENT)
Dept: INFECTIOUS DISEASES | Age: 36
End: 2021-03-22

## 2021-03-22 NOTE — TELEPHONE ENCOUNTER
Bárbara Purcell MD 3/22/2021 10:00 AM Margo Black, :[birth year]. Last day for IV antibiotics is 3/25. He has an appt with you on 3/29. Can the line be pulled after last dose? Ayesha Matson Read 3/22/2021 10:01 AM 3/22/2021 10:05 AM Yes olst 3/22/2021 10:05 AM Pls    Called, spoke to Ramon Ortiz and gave him the instructions. Patient was also made aware.

## 2021-03-25 ENCOUNTER — TELEPHONE (OUTPATIENT)
Dept: INFECTIOUS DISEASES | Age: 36
End: 2021-03-25

## 2021-03-25 NOTE — TELEPHONE ENCOUNTER
Patient:  Juan Magaña   38year old male   1985  Has 3 bags of ABX left and he wants to know if you will authorize for his nurse to pull the picc line today. He is in cardiac therapy and moves his arm and it is bothering him. please advise. Thanks, Emma Read 3/25/2021 8:56 AM 3/25/2021 8:56 AM   Pls go ahead. Yes. [place] [employer] SEND © 2021, [address] at [phone]. She had orders to pull at end of therapy I advised her she can pull today so she took a verbal and gave it to [place] at [place] [address] patient to inform.    `

## 2021-03-29 ENCOUNTER — OFFICE VISIT (OUTPATIENT)
Dept: INFECTIOUS DISEASES | Age: 36
End: 2021-03-29
Payer: MEDICARE

## 2021-03-29 VITALS
HEIGHT: 69 IN | OXYGEN SATURATION: 100 % | RESPIRATION RATE: 20 BRPM | DIASTOLIC BLOOD PRESSURE: 95 MMHG | BODY MASS INDEX: 20.91 KG/M2 | TEMPERATURE: 98 F | WEIGHT: 141.2 LBS | HEART RATE: 85 BPM | SYSTOLIC BLOOD PRESSURE: 134 MMHG

## 2021-03-29 DIAGNOSIS — A69.23 LYME ARTHRITIS (HCC): Primary | ICD-10-CM

## 2021-03-29 PROCEDURE — 99213 OFFICE O/P EST LOW 20 MIN: CPT | Performed by: INTERNAL MEDICINE

## 2021-03-29 PROCEDURE — G8484 FLU IMMUNIZE NO ADMIN: HCPCS | Performed by: INTERNAL MEDICINE

## 2021-03-29 PROCEDURE — 1036F TOBACCO NON-USER: CPT | Performed by: INTERNAL MEDICINE

## 2021-03-29 PROCEDURE — G8428 CUR MEDS NOT DOCUMENT: HCPCS | Performed by: INTERNAL MEDICINE

## 2021-03-29 PROCEDURE — G8420 CALC BMI NORM PARAMETERS: HCPCS | Performed by: INTERNAL MEDICINE

## 2021-03-29 RX ORDER — BUPROPION HYDROCHLORIDE 300 MG/1
TABLET ORAL
COMMUNITY
Start: 2021-03-18

## 2021-03-29 RX ORDER — ALPRAZOLAM 1 MG/1
TABLET, EXTENDED RELEASE ORAL
COMMUNITY
Start: 2021-03-16

## 2021-03-29 RX ORDER — DULOXETIN HYDROCHLORIDE 30 MG/1
CAPSULE, DELAYED RELEASE ORAL
COMMUNITY
Start: 2021-03-12 | End: 2021-08-09

## 2021-03-29 RX ORDER — LABETALOL 100 MG/1
TABLET, FILM COATED ORAL
COMMUNITY
Start: 2021-03-11

## 2021-03-29 ASSESSMENT — ENCOUNTER SYMPTOMS
EYE ITCHING: 0
CHOKING: 0
COLOR CHANGE: 0
EYE PAIN: 0
ABDOMINAL PAIN: 0
APNEA: 0

## 2021-03-29 NOTE — PROGRESS NOTES
[facility] - Initial Consult Note  Today's Date: 3/29/2021    Impression :   2/15/21 :  · Lyme disease by Pos titers  1/4/2021  · Exposed to [address], no tick or pamela hx- fatigue increased x 2 mon, WB IGG and SUKHDEEP IGM- [facility], a little discordant, - never treated -  · 2/15/21 amoxicillin 21 days  · 2/17/21 Arthralgias, possibly lyme related - AB switched to ceftriaxone 2 g daily  · 2/17/21 lyme +, WB IGG +STV  · 3/1/2021 rash to ceftriaxone - 3/1/21 switched to meropenem 1 g tid till 3/24, joints much better after that -  · Hence took total 4 weeks iv AB total  · Dr Elodia Araya - not concerned about a rheuma illness  -3/15/21 roughly  ·   · Wt loss  · 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 Repeat lyme titers in 3 months  See me in 3 months  cll me if issues - do not anticipate further issues from lyme       Diagnosis Orders   1. Lyme arthritis ([facility] [address]  Lyme Ab       Return in about 3 months (around 6/29/2021). History of Present Illness:   Donnia Litten is a 38y.o.-year-old  male who presents with   Chief Complaint   Patient presents with    Lyme Disease     no concerns no longer on iv abx   visit new pt -2/15/21  · Lyme disease by Pos titers  1/4/2021  · Exposed to [place], no tick or pamela hx- fatigue increased x 2 mon, lyme WB IGG and SUKHDEEP IGM- ?? 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  Burgdorferi IgM 0.16 (H)     B. Burgdorferi IgG 0.02     Lyme IgG WBlot            Positive          A             NEGATIVE     Lyme IgG Bands            See below                            p93,p58,p45,p41,p39,p30,p23,p18  CDC criteria for a positive Western blot are the presence of  >= 5 bands for IgG. Lyme IgM WBlot            Negative                       NEGATIVE     Lyme IgM Bands            None      Visit 3/1/21  Did not do well on the amox and started w sevree diffuse joints pain - sent to infusion for  ceftriaxone and had a rash after first does  w hives - prednisolone made him betetr and sent out on doxy  Po -since 2/25. But called w severe progressive generalized large joint pains, no acute infl or arthritis, no past similar hx -    lost lot of wt recently    Plan:  Try meropenem 2 weeks  DESOUZA for rheum   Ask Rheum to see very soon for alternative diag  Get HIV test  Get VDRL  And GC chlamydia      Results for Ave Kumar (MRN [id number]) as of 3/10/2021 13:12   Ref. Range 2/17/2021 10:25   Lyme Ab Latest Ref Range: <0.91  0.98 (H)   Lyme IgG Wb Latest Ref Range: Negative  Positive   Lyme IgM Wb Latest Ref Range: Negative  Negative       Visit 3/10/21    HIV neg  T pallidium neg  RF neg  ESR 1  CRP 3.8  SUYAPA neg    GC chlamydia U PCR neg    Still has joint pains and no acute arthritis but arthralgias  Myalgias better  On NSAIDS  Sees Dr Dinah Limon in few days    Today day 10 meropenem. Keep course x 3 more weeks till all pain gone, treating lyme arthritis where pt reacted to ceftriaxone. Visit 3/29/21  Feels great and all te joint pain has resolved - stopped meropenem at 24 days total between it and ceftriaxone - line out -asking if lyme can relapse - I told him it is fully treated but might be caught again -  See me in 3 months w a lyme titers - call me if issues otherwise        I have personally reviewed the past medical history, past surgical history, medications, social history, and family history, and I haveupdated the database accordingly.   Past Medical History:     Past Medical History:   Diagnosis Date    Asthma     Depression     Lyme disease     Rectal bleeding        Past Surgical  History:     Past Surgical History:   Procedure Laterality Date    APPENDECTOMY      GALLBLADDER SURGERY      HERNIA REPAIR      HERNIA REPAIR      INSERT MIDLINE CATHETER  2/25/2021         PACEMAKER CHANGE         Medications:     Current Outpatient Medications:     ibuprofen (ADVIL;MOTRIN) 600 MG tablet, Take 1 tablet by mouth 3 times daily as needed for Pain, Disp: 90 tablet, Rfl: 0    ALPRAZolam (XANAX) 1 MG tablet, Take 1 mg by mouth., Disp: , Rfl:     buPROPion (WELLBUTRIN SR) 150 MG extended release tablet, Take 150 mg by mouth, Disp: , Rfl:     pyridostigmine (MESTINON) 60 MG tablet, pyridostigmine bromide 60 mg tablet  TAKE 2 TABLETS BY MOUTH THREE TIMES A DAY, Disp: , Rfl:     pantoprazole (PROTONIX) 40 MG tablet, pantoprazole 40 mg tablet,delayed release, Disp: , Rfl:     solifenacin (VESICARE) 5 MG tablet, solifenacin 5 mg tablet, Disp: , Rfl:     tamsulosin (FLOMAX) 0.4 MG capsule, tamsulosin 0.4 mg capsule  Take 1 capsule every day by oral route for 90 days. , Disp: , Rfl:     trimethobenzamide (TIGAN) 300 MG capsule, Tigan 300 mg capsule  Take 1 capsule every 8 hours by oral route as needed. , Disp: , Rfl:     polyethylene glycol (GLYCOLAX) 17 GM/SCOOP powder, polyethylene glycol 3350 17 gram oral powder packet, Disp: , Rfl:     gabapentin (NEURONTIN) 600 MG tablet, Take 600 mg by mouth 3 times daily. , Disp: , Rfl:     albuterol sulfate  (90 Base) MCG/ACT inhaler, albuterol sulfate HFA 90 mcg/actuation aerosol inhaler  PRn, Disp: , Rfl:     guaiFENesin (MUCINEX) 600 MG extended release tablet, Mucinex 600 mg tablet, extended release, Disp: , Rfl:     polyethylene glycol (GOLYTELY) 236 G solution, Use as directed for bowel prep, Disp: 4000 mL, Rfl: 0      Social History:     Social History     Socioeconomic History    Marital status: Single     Spouse name: Not on file    Number of children: Not on file    Years of education: Not on file    Highest education level: Not on file   Occupational History    Not on file   Social Needs    Financial resource strain: Not on file    Food insecurity     Worry: Not on file     Inability: Not on file    Transportation needs     Medical: Not on file     Non-medical: Not on file   Tobacco Use    Smoking status: Former Smoker     Packs/day: 1.00     Types: Cigarettes     Quit date: 5/15/2020     Years since quittin.8    Smokeless tobacco: Never Used   Substance and Sexual Activity    Alcohol use: Yes     Alcohol/week: 0.0 standard drinks     Comment: weekend drinking 12 pack at a time    Drug use: No    Sexual activity: Not on file   Lifestyle    Physical activity     Days per week: Not on file     Minutes per session: Not on file    Stress: Not on file   Relationships    Social connections     Talks on phone: Not on file     Gets together: Not on file     Attends Yazidism service: Not on file     Active member of club or organization: Not on file     Attends meetings of clubs or organizations: Not on file     Relationship status: Not on file    Intimate partner violence     Fear of current or ex partner: Not on file     Emotionally abused: Not on file     Physically abused: Not on file     Forced sexual activity: Not on file   Other Topics Concern    Not on file   Social History Narrative    Not on file       Family History:     Family History   Problem Relation Age of Onset    Kidney Disease Mother         Allergies:   Rocephin [ceftriaxone], Diltiazem, Ondansetron hcl, and Zofran  [ondansetron]     Review of Systems:   Review of Systems   Constitutional: Negative for activity change, appetite change, chills, fatigue and fever. HENT: Negative for congestion. Eyes: Negative for pain and itching. Respiratory: Negative for apnea and choking. Cardiovascular: Negative for chest pain. Gastrointestinal: Negative for abdominal pain.    Endocrine: Negative for heat intolerance. Genitourinary: Negative for dysuria and flank pain. Musculoskeletal: Negative for arthralgias. Skin: Negative for color change. Allergic/Immunologic: Negative for immunocompromised state. Neurological: Negative for dizziness and headaches. Hematological: Negative for adenopathy. Psychiatric/Behavioral: Negative for agitation. Fogginess       Physical Examination :   Blood pressure (!) 134/95, pulse 85, temperature 98 °F (36.7 °C), temperature source Temporal, resp. rate 20, height 5' 9\" (1.753 m), weight 141 lb 3.2 oz (64 kg), SpO2 100 %. Physical Exam  Constitutional:       General: He is not in acute distress. Appearance: Normal appearance. He is not ill-appearing. HENT:      Head: Normocephalic and atraumatic. Nose: Nose normal.      Mouth/Throat:      Mouth: Mucous membranes are moist.   Eyes:      General: No scleral icterus. Neck:      Musculoskeletal: Neck supple. No neck rigidity or muscular tenderness. Cardiovascular:      Rate and Rhythm: Normal rate and regular rhythm. Heart sounds: Normal heart sounds. No murmur. Pulmonary:      Effort: No respiratory distress. Breath sounds: No wheezing. Abdominal:      General: There is no distension. Palpations: Abdomen is soft. Tenderness: There is no abdominal tenderness. Genitourinary:     Comments: No estes  Musculoskeletal:         General: No swelling, tenderness or deformity. Skin:     General: Skin is dry. Coloration: Skin is not jaundiced or pale. Findings: No bruising. Neurological:      General: No focal deficit present. Mental Status: He is alert and oriented to person, place, and time. Cranial Nerves: No cranial nerve deficit. Psychiatric:         Mood and Affect: Mood normal.         Thought Content:  Thought content normal.           Medical Decision Making:   I have independently reviewed/ordered the following labs:    CBCwith Differential: No results found for: WBC, HGB, HCT, PLT, SEGSPCT, BANDSPCT, LYMPHOPCT, MONOPCT, EOSPCT  BMP:No results found for: NA, K, CL, CO2, BUN, CREATININE, MG  Hepatic Function Panel: No results found for: PROT, LABALBU, BILIDIR, IBILI, BILITOT, ALKPHOS, ALT, AST  No results found for: RPR  No results found for: HIV  No results found for: BC  No results found for: EPICELLS, MUCUS, PH, RBC, TRICHOMONAS, WBC, YEAST, TURBIDITY  No results found for: CREATININE, GLUCOSE, Aida Megan for allowing us to participate in the care of this patient. Please call with questions. Claressa Soulier, MD  - Office: [phone]    Please note that this chart was generated using voice recognition Dragon dictation software. Although every effort was made to ensure the accuracy of this automated transcription, some errors in transcription mayhave occurred.

## 2021-03-30 ENCOUNTER — HOSPITAL ENCOUNTER (OUTPATIENT)
Age: 36
Discharge: HOME | End: 2021-03-30
Payer: MEDICARE

## 2021-03-30 DIAGNOSIS — A69.23 LYME ARTHRITIS (HCC): ICD-10-CM

## 2021-03-30 PROCEDURE — 86618 LYME DISEASE ANTIBODY: CPT

## 2021-03-30 PROCEDURE — 86617 LYME DISEASE ANTIBODY: CPT

## 2021-03-30 PROCEDURE — 36415 COLL VENOUS BLD VENIPUNCTURE: CPT

## 2021-04-01 LAB — LYME ANTIBODY: 1.43

## 2021-04-03 LAB
LYME IGM WB: POSITIVE
LYME WESTERN BLOT IGG: NEGATIVE

## 2021-08-09 ENCOUNTER — OFFICE VISIT (OUTPATIENT)
Dept: INFECTIOUS DISEASES | Age: 36
End: 2021-08-09
Payer: MEDICARE

## 2021-08-09 ENCOUNTER — HOSPITAL ENCOUNTER (OUTPATIENT)
Age: 36
Discharge: HOME | End: 2021-08-09
Payer: MEDICARE

## 2021-08-09 VITALS
HEIGHT: 69 IN | WEIGHT: 135 LBS | SYSTOLIC BLOOD PRESSURE: 116 MMHG | TEMPERATURE: 98.1 F | DIASTOLIC BLOOD PRESSURE: 81 MMHG | BODY MASS INDEX: 19.99 KG/M2

## 2021-08-09 DIAGNOSIS — A69.23 LYME ARTHRITIS (HCC): Primary | ICD-10-CM

## 2021-08-09 DIAGNOSIS — M25.561 ARTHRALGIA OF BOTH KNEES: ICD-10-CM

## 2021-08-09 DIAGNOSIS — A69.23 LYME ARTHRITIS (HCC): ICD-10-CM

## 2021-08-09 DIAGNOSIS — M25.562 ARTHRALGIA OF BOTH KNEES: ICD-10-CM

## 2021-08-09 DIAGNOSIS — M25.571 ARTHRALGIA OF BOTH ANKLES: ICD-10-CM

## 2021-08-09 DIAGNOSIS — M25.572 ARTHRALGIA OF BOTH ANKLES: ICD-10-CM

## 2021-08-09 LAB
C-REACTIVE PROTEIN: 3.3 MG/L (ref 0–5)
SEDIMENTATION RATE, ERYTHROCYTE: 1 MM (ref 0–15)
TSH SERPL DL<=0.05 MIU/L-ACNC: 1.48 MIU/L (ref 0.3–5)

## 2021-08-09 PROCEDURE — 86618 LYME DISEASE ANTIBODY: CPT

## 2021-08-09 PROCEDURE — 86140 C-REACTIVE PROTEIN: CPT

## 2021-08-09 PROCEDURE — 99214 OFFICE O/P EST MOD 30 MIN: CPT | Performed by: INTERNAL MEDICINE

## 2021-08-09 PROCEDURE — 85652 RBC SED RATE AUTOMATED: CPT

## 2021-08-09 PROCEDURE — 1036F TOBACCO NON-USER: CPT | Performed by: INTERNAL MEDICINE

## 2021-08-09 PROCEDURE — G8427 DOCREV CUR MEDS BY ELIG CLIN: HCPCS | Performed by: INTERNAL MEDICINE

## 2021-08-09 PROCEDURE — 36415 COLL VENOUS BLD VENIPUNCTURE: CPT

## 2021-08-09 PROCEDURE — 86617 LYME DISEASE ANTIBODY: CPT

## 2021-08-09 PROCEDURE — G8420 CALC BMI NORM PARAMETERS: HCPCS | Performed by: INTERNAL MEDICINE

## 2021-08-09 PROCEDURE — 84443 ASSAY THYROID STIM HORMONE: CPT

## 2021-08-09 RX ORDER — AMOXICILLIN 500 MG/1
500 CAPSULE ORAL 3 TIMES DAILY
Qty: 63 CAPSULE | Refills: 0 | Status: SHIPPED | OUTPATIENT
Start: 2021-08-09 | End: 2021-08-30

## 2021-08-09 RX ORDER — AMOXICILLIN 500 MG/1
500 CAPSULE ORAL 2 TIMES DAILY
Qty: 42 CAPSULE | Refills: 0 | Status: SHIPPED | OUTPATIENT
Start: 2021-08-09 | End: 2021-08-09

## 2021-08-09 ASSESSMENT — ENCOUNTER SYMPTOMS
APNEA: 0
ABDOMINAL PAIN: 0
CHOKING: 0
COLOR CHANGE: 0
EYE REDNESS: 0
EYE ITCHING: 0
EYE PAIN: 0

## 2021-08-09 NOTE — PROGRESS NOTES
[facility] - Initial Consult Note  Today's Date: 8/9/2021    Impression :   2/15/21 :  · Lyme disease by Pos titers  1/4/2021  · Exposed to [address], no tick or pamela hx- fatigue increased x 2 mon, WB IGG and SUKHDEEP IGM- [facility], a little discordant, - never treated -  · 2/15/21 amoxicillin 21 days  · 2/17/21 Arthralgias, possibly lyme related - AB switched to ceftriaxone 2 g daily  · 2/17/21 lyme +, WB IGG +STV  · 3/1/2021 rash to ceftriaxone - 3/1/21 switched to meropenem 1 g tid till 3/24, joints much better after that -  · Hence took total 4 weeks iv AB total  · Dr Sudheer Hairston - not concerned about a rheuma illness  -3/15/21 roughly, gave him duloxetine  · Echo neg 3/18/21  · 8/9/21 weak legs and some leg ft pain relapsing  ·   · Wt loss  · 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 Plan:  21 days amox po and see if it helps  Repeat lyme titers and get CRP ESR CBC diff       Diagnosis Orders   1. Lyme arthritis (HCC)  Lyme Ab    C-Reactive Protein    Sedimentation Rate    TSH With Reflex Ft4    amoxicillin (AMOXIL) 500 MG capsule   2. Arthralgia of both knees  Lyme Ab    C-Reactive Protein    Sedimentation Rate    TSH With Reflex Ft4   3. Arthralgia of both ankles  Lyme Ab    C-Reactive Protein    Sedimentation Rate    TSH With Reflex Ft4       Return in about 3 weeks (around 8/30/2021). History of Present Illness:   Akil Noel is a 39y.o.-year-old  male who presents with   Chief Complaint   Patient presents with    Frequent Infections     3 month follow up - per patient legs are weak   visit new pt -2/15/21  · Lyme disease by Pos titers  1/4/2021  · Exposed to [place], no tick or pamela hx- fatigue increased x 2 mon, lyme WB IGG and SUKHDEEP IGM- ??  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 Burgdorferi IgM 0.16 (H)     B. Burgdorferi IgG 0.02     Lyme IgG WBlot            Positive          A             NEGATIVE     Lyme IgG Bands            See below                            p93,p58,p45,p41,p39,p30,p23,p18  CDC criteria for a positive Western blot are the presence of  >= 5 bands for IgG. Lyme IgM WBlot            Negative                       NEGATIVE     Lyme IgM Bands            None      Visit 3/1/21  Did not do well on the amox and started w sevree diffuse joints pain - sent to infusion for  ceftriaxone and had a rash after first does  w hives - prednisolone made him betetr and sent out on doxy  Po -since 2/25. But called w severe progressive generalized large joint pains, no acute infl or arthritis, no past similar hx -    lost lot of wt recently    Plan:  Try meropenem 2 weeks  DESOUZA for rheum   Ask Rheum to see very soon for alternative diag  Get HIV test  Get VDRL  And GC chlamydia      Results for Kacey Stain (MRN [id number]) as of 3/10/2021 13:12   Ref. Range 2/17/2021 10:25   Lyme Ab Latest Ref Range: <0.91  0.98 (H)   Lyme IgG Wb Latest Ref Range: Negative  Positive   Lyme IgM Wb Latest Ref Range: Negative  Negative       Visit 3/10/21    HIV neg  T pallidium neg  RF neg  ESR 1  CRP 3.8  SUYAPA neg    GC chlamydia U PCR neg    Still has joint pains and no acute arthritis but arthralgias  Myalgias better  On NSAIDS  Sees Dr Leo Villaseñor in few days    Today day 10 meropenem. Keep course x 3 more weeks till all pain gone, treating lyme arthritis where pt reacted to ceftriaxone.     Visit 3/29/21  Feels great and all the joint pain has resolved - stopped meropenem at 24 days total between it and ceftriaxone - line out -asking if lyme can relapse - I told him it is fully treated but might be caught again -  See me in 3 months w a lyme titers - call me if issues otherwise    Visit 8/9/21  Back weak legs, joints pain and muscle pains bilat LE - none UE - x few months since end of May 2021. Wondering if from [address] is leading to the joint pains -  Today the weakness of the legs is worse than the pain of the legs. Exam neg - no large LN  Has abd pain and pain injection planned. Uses marijuana for the pain, helps for the abd pain and jts. jts not swollen and not tender    Plan:  21 days amox po and see if it helps  Repeat lyme titers and get CRP ESR CBC diff        I have personally reviewed the past medical history, past surgical history, medications, social history, and family history, and I haveupdated the database accordingly.   Past Medical History:     Past Medical History:   Diagnosis Date    Asthma     Depression     Lyme disease     Rectal bleeding        Past Surgical  History:     Past Surgical History:   Procedure Laterality Date    APPENDECTOMY      GALLBLADDER SURGERY      HERNIA REPAIR      HERNIA REPAIR      INSERT MIDLINE CATHETER  2/25/2021         PACEMAKER CHANGE         Medications:     Current Outpatient Medications:     amoxicillin (AMOXIL) 500 MG capsule, Take 1 capsule by mouth 2 times daily for 21 days, Disp: 42 capsule, Rfl: 0    ALPRAZOLAM XR 1 MG extended release tablet, TAKE 1 TABLET BY MOUTH EVERY DAY, Disp: , Rfl:     labetalol (NORMODYNE) 100 MG tablet, TAKE 1/2 TABLET BY MOUTH TWO TIMES A DAY AS NEEDED WHEN SYSTOLIC BLOOD PRESSURE GREATER THAN 170MMHG, Disp: , Rfl:     buPROPion (WELLBUTRIN XL) 300 MG extended release tablet, TAKE 1 TABLET BY MOUTH EVERY DAY, Disp: , Rfl:     trimethobenzamide (TIGAN) 300 MG capsule, TAKE 1 CAPSULE BY MOUTH EVERY EIGHT HOURS AS NEEDED FOR NAUSEA, Disp: , Rfl:     ALPRAZolam (XANAX) 1 MG tablet, Take 1 mg by mouth., Disp: , Rfl:   buPROPion (WELLBUTRIN SR) 150 MG extended release tablet, Take 150 mg by mouth, Disp: , Rfl:     pyridostigmine (MESTINON) 60 MG tablet, pyridostigmine bromide 60 mg tablet  TAKE 2 TABLETS BY MOUTH THREE TIMES A DAY, Disp: , Rfl:     pantoprazole (PROTONIX) 40 MG tablet, pantoprazole 40 mg tablet,delayed release, Disp: , Rfl:     tamsulosin (FLOMAX) 0.4 MG capsule, tamsulosin 0.4 mg capsule  Take 1 capsule every day by oral route for 90 days. , Disp: , Rfl:     trimethobenzamide (TIGAN) 300 MG capsule, Tigan 300 mg capsule  Take 1 capsule every 8 hours by oral route as needed. , Disp: , Rfl:     polyethylene glycol (GLYCOLAX) 17 GM/SCOOP powder, polyethylene glycol 3350 17 gram oral powder packet, Disp: , Rfl:     gabapentin (NEURONTIN) 600 MG tablet, Take 600 mg by mouth 3 times daily. , Disp: , Rfl:     albuterol sulfate  (90 Base) MCG/ACT inhaler, albuterol sulfate HFA 90 mcg/actuation aerosol inhaler  PRn, Disp: , Rfl:     guaiFENesin (MUCINEX) 600 MG extended release tablet, Mucinex 600 mg tablet, extended release, Disp: , Rfl:     polyethylene glycol (GOLYTELY) 236 G solution, Use as directed for bowel prep, Disp: 4000 mL, Rfl: 0      Social History:     Social History     Socioeconomic History    Marital status: Single     Spouse name: Not on file    Number of children: Not on file    Years of education: Not on file    Highest education level: Not on file   Occupational History    Not on file   Tobacco Use    Smoking status: Former Smoker     Packs/day: 1.00     Types: Cigarettes     Quit date: 5/15/2020     Years since quittin.2    Smokeless tobacco: Never Used   Substance and Sexual Activity    Alcohol use:  Yes     Alcohol/week: 0.0 standard drinks     Comment: weekend drinking 12 pack at a time    Drug use: No    Sexual activity: Not on file   Other Topics Concern    Not on file   Social History Narrative    Not on file     Social Determinants of Health     Financial Resource Strain:     Difficulty of Paying Living Expenses:    Food Insecurity:     Worried About Running Out of Food in the Last Year:     [address] in the Last Year:    Transportation Needs:     Lack of Transportation (Medical):  Lack of Transportation (Non-Medical):    Physical Activity:     Days of Exercise per Week:     Minutes of Exercise per Session:    Stress:     Feeling of Stress :    Social Connections:     Frequency of Communication with Friends and Family:     Frequency of Social Gatherings with Friends and Family:     Attends Jehovah's witness Services:     Active Member of Clubs or Organizations:     Attends Club or Organization Meetings:     Marital Status:    Intimate Partner Violence:     Fear of Current or Ex-Partner:     Emotionally Abused:     Physically Abused:     Sexually Abused:        Family History:     Family History   Problem Relation Age of Onset    Kidney Disease Mother         Allergies:   Rocephin [ceftriaxone], Diltiazem, Ondansetron hcl, and Zofran  [ondansetron]     Review of Systems:   Review of Systems   Constitutional: Negative for activity change, appetite change, chills, fatigue and fever. HENT: Negative for congestion. Eyes: Negative for pain, redness and itching. Respiratory: Negative for apnea and choking. Cardiovascular: Negative for chest pain. Gastrointestinal: Negative for abdominal pain. Endocrine: Negative for heat intolerance. Genitourinary: Negative for dysuria and flank pain. Musculoskeletal: Positive for arthralgias and myalgias. Mostly the legs - arms are ok - feels weak   Skin: Negative for color change. Allergic/Immunologic: Negative for immunocompromised state. Neurological: Negative for dizziness and headaches. Hematological: Negative for adenopathy. Psychiatric/Behavioral: Negative for agitation.         Fogginess       Physical Examination :   Blood pressure 116/81, temperature 98.1 °F (36.7 °C), allowing us to participate in the care of this patient. Please call with questions. Grecia Perez MD  - Office: [phone]    Please note that this chart was generated using voice recognition Dragon dictation software. Although every effort was made to ensure the accuracy of this automated transcription, some errors in transcription mayhave occurred.

## 2021-08-10 LAB — LYME ANTIBODY: 1.02

## 2021-08-12 LAB
LYME IGM WB: POSITIVE
LYME WESTERN BLOT IGG: NEGATIVE

## 2021-09-01 ENCOUNTER — OFFICE VISIT (OUTPATIENT)
Dept: INFECTIOUS DISEASES | Age: 36
End: 2021-09-01
Payer: MEDICARE

## 2021-09-01 VITALS
BODY MASS INDEX: 19.82 KG/M2 | HEART RATE: 80 BPM | TEMPERATURE: 98.2 F | RESPIRATION RATE: 20 BRPM | SYSTOLIC BLOOD PRESSURE: 118 MMHG | HEIGHT: 69 IN | WEIGHT: 133.8 LBS | DIASTOLIC BLOOD PRESSURE: 81 MMHG | OXYGEN SATURATION: 99 %

## 2021-09-01 DIAGNOSIS — R76.8 POSITIVE LYME DISEASE SEROLOGY: Primary | ICD-10-CM

## 2021-09-01 PROCEDURE — 1036F TOBACCO NON-USER: CPT | Performed by: INTERNAL MEDICINE

## 2021-09-01 PROCEDURE — 99214 OFFICE O/P EST MOD 30 MIN: CPT | Performed by: INTERNAL MEDICINE

## 2021-09-01 PROCEDURE — G8420 CALC BMI NORM PARAMETERS: HCPCS | Performed by: INTERNAL MEDICINE

## 2021-09-01 PROCEDURE — G8427 DOCREV CUR MEDS BY ELIG CLIN: HCPCS | Performed by: INTERNAL MEDICINE

## 2021-09-01 RX ORDER — PROCHLORPERAZINE MALEATE 5 MG/1
TABLET ORAL
COMMUNITY
Start: 2021-08-25

## 2021-09-01 RX ORDER — GABAPENTIN 600 MG/1
TABLET ORAL
COMMUNITY
Start: 2021-08-30

## 2021-09-01 RX ORDER — DROXIDOPA 300 MG/1
CAPSULE ORAL
COMMUNITY
Start: 2021-08-19

## 2021-09-01 RX ORDER — BUPROPION HYDROCHLORIDE 150 MG/1
TABLET ORAL
COMMUNITY
Start: 2021-08-04

## 2021-09-01 ASSESSMENT — ENCOUNTER SYMPTOMS
EYE ITCHING: 0
COLOR CHANGE: 0
EYE REDNESS: 0
EYE PAIN: 0
CHOKING: 0
ABDOMINAL PAIN: 0
APNEA: 0

## 2021-09-01 NOTE — PATIENT INSTRUCTIONS
Dr Carmen Mckay in [facility] [place] - rheumatology  Fax [phone] fax all med records - my notes and all the lyme titers    apointt is Dec3, 8:45 am log in , 9:00 am start time    9/10/21-Faxed above.  Juancarlos Aragon

## 2021-09-01 NOTE — PROGRESS NOTES
[facility] - Initial Consult Note  Today's Date: 9/1/2021    Impression :   2/15/21 :  · Lyme disease by Pos titers  1/4/2021  · Exposed to [address], no tick or pamela hx- fatigue increased x 2 mon, WB IGG and SUKHDEEP IGM- [facility], a little discordant, - never treated -  · 2/15/21 amoxicillin 21 days  · 2/17/21 Arthralgias, possibly lyme related - AB switched to ceftriaxone 2 g daily  · 2/17/21 lyme +, WB IGG +STV  · 3/1/2021 rash to ceftriaxone - 3/1/21 switched to meropenem 1 g tid till 3/24, joints much better after that -  · Hence took total 4 weeks iv AB total  · Dr Srinath Tai - not concerned about a rheuma illness  -3/15/21 roughly, gave him duloxetine  · Echo neg 3/18/21  · 8/9/21 weak legs and some leg ft pain relapsing  · Wt loss  · 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 Then relapsed  Then 8/2021 21 days amoxi wo response on the knee pains    8/2021 Lyme titers are again showing WB IGM + and IGG neg, same for 3/2021  Hence titers not progressing, and raising he possibility of a false + lyme titer, mostly that the knees continue to hurt and are not better. Will refer to [facility] and rheumato for second opinion. Diagnosis Orders   1. Positive Lyme disease serology         Return in about 3 months (around 12/1/2021).       History of Present Illness:   Norma Amaya is a 39y.o.-year-old  male who presents with   Chief Complaint   Patient presents with    Lyme Disease     leg weakness joint pain amoxcillin didnt work pt stated/ was told to go to [facility] or [facility] wondering of a closer location or something be worked out with ins.    visit new pt -2/15/21  · Lyme disease by Pos titers  1/4/2021  · Exposed to [place], no tick or pamela hx- fatigue increased x 2 mon, lyme WB IGG and SUKHDEEP IGM- ?? 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 Burgdorferi IgM 0.16 (H)     B. Burgdorferi IgG 0.02     Lyme IgG WBlot            Positive          A             NEGATIVE     Lyme IgG Bands            See below                            p93,p58,p45,p41,p39,p30,p23,p18  CDC criteria for a positive Western blot are the presence of  >= 5 bands for IgG. Lyme IgM WBlot            Negative                       NEGATIVE     Lyme IgM Bands            None      Visit 3/1/21  Did not do well on the amox and started w sevree diffuse joints pain - sent to infusion for  ceftriaxone and had a rash after first does  w hives - prednisolone made him betetr and sent out on doxy  Po -since 2/25. But called w severe progressive generalized large joint pains, no acute infl or arthritis, no past similar hx -    lost lot of wt recently    Plan:  Try meropenem 2 weeks  DESOUZA for rheum   Ask Rheum to see very soon for alternative diag  Get HIV test  Get VDRL  And GC chlamydia      Results for Vicente Vasquez (MRN [id number]) as of 3/10/2021 13:12   Ref. Range 2/17/2021 10:25   Lyme Ab Latest Ref Range: <0.91  0.98 (H)   Lyme IgG Wb Latest Ref Range: Negative  Positive   Lyme IgM Wb Latest Ref Range: Negative  Negative       Visit 3/10/21    HIV neg  T pallidium neg  RF neg  ESR 1  CRP 3.8  SUYAPA neg    GC chlamydia U PCR neg    Still has joint pains and no acute arthritis but arthralgias  Myalgias better  On NSAIDS  Sees Dr Dinora Bailey in few days    Today day 10 meropenem. Keep course x 3 more weeks till all pain gone, treating lyme arthritis where pt reacted to ceftriaxone.     Visit 3/29/21  Feels great and all the joint pain has resolved - stopped meropenem at 24 days total between it and ceftriaxone - line out -asking if lyme can relapse - I told him it is fully treated but might be caught again -  See me in 3 months w a lyme titers - call me if issues otherwise    Lyme SUKHDEEP +, WB IGM + and IGG neg    Visit 8/9/21  Back weak legs, joints pain and muscle pains bilat LE - none UE - x few months since end of May 2021. Wondering if from [address] is leading to the joint pains -  Today the weakness of the legs is worse than the pain of the legs. Exam neg - no large LN  Has abd pain and pain injection planned. Uses marijuana for the pain, helps for the abd pain and jts. jts not swollen and not tender    Plan:  21 days amox po and see if it helps  Repeat lyme titers and get CRP ESR CBC diff    Visit 9/1/21  Back injection helped his back pain and he will have a nerve ablation  Joint pains still same and no swelling, knees, mostly and leg muscles feel weak  Arm joints and ankles are not an issue  Despite the amoxicillin 28 days - done    Lyme titers are again showing WB IGM + and IGG neg, same for 3/2021  Hence titers not progressing, and raising he possibility of a false + lyme titer, mostly that the knees continue to hurt and are not better. Will refer to [facility] and rheumato for second opinion. I have personally reviewed the past medical history, past surgical history, medications, social history, and family history, and I haveupdated the database accordingly.   Past Medical History:     Past Medical History:   Diagnosis Date    Asthma     Depression     Lyme disease     Rectal bleeding        Past Surgical  History:     Past Surgical History:   Procedure Laterality Date    APPENDECTOMY      GALLBLADDER SURGERY      HERNIA REPAIR      HERNIA REPAIR      INSERT MIDLINE CATHETER  2/25/2021         PACEMAKER CHANGE         Medications:     Current Outpatient Medications:     Droxidopa 300 MG CAPS, , Disp: , Rfl:    prochlorperazine (COMPAZINE) 5 MG tablet, TAKE 1 TABLET BY MOUTH EVERY SIX HOURS AS NEEDED FOR NAUSEA AND VOMITING, Disp: , Rfl:     gabapentin (NEURONTIN) 600 MG tablet, TAKE 1 TABLET BY MOUTH THREE TIMES A DAY, Disp: , Rfl:     ALPRAZOLAM XR 1 MG extended release tablet, TAKE 1 TABLET BY MOUTH EVERY DAY, Disp: , Rfl:     labetalol (NORMODYNE) 100 MG tablet, TAKE 1/2 TABLET BY MOUTH TWO TIMES A DAY AS NEEDED WHEN SYSTOLIC BLOOD PRESSURE GREATER THAN 170MMHG, Disp: , Rfl:     buPROPion (WELLBUTRIN XL) 300 MG extended release tablet, TAKE 1 TABLET BY MOUTH EVERY DAY, Disp: , Rfl:     trimethobenzamide (TIGAN) 300 MG capsule, TAKE 1 CAPSULE BY MOUTH EVERY EIGHT HOURS AS NEEDED FOR NAUSEA, Disp: , Rfl:     ALPRAZolam (XANAX) 1 MG tablet, Take 1 mg by mouth., Disp: , Rfl:     pyridostigmine (MESTINON) 60 MG tablet, pyridostigmine bromide 60 mg tablet  TAKE 2 TABLETS BY MOUTH THREE TIMES A DAY, Disp: , Rfl:     pantoprazole (PROTONIX) 40 MG tablet, pantoprazole 40 mg tablet,delayed release, Disp: , Rfl:     tamsulosin (FLOMAX) 0.4 MG capsule, tamsulosin 0.4 mg capsule  Take 1 capsule every day by oral route for 90 days. , Disp: , Rfl:     trimethobenzamide (TIGAN) 300 MG capsule, Tigan 300 mg capsule  Take 1 capsule every 8 hours by oral route as needed. , Disp: , Rfl:     polyethylene glycol (GLYCOLAX) 17 GM/SCOOP powder, polyethylene glycol 3350 17 gram oral powder packet, Disp: , Rfl:     albuterol sulfate  (90 Base) MCG/ACT inhaler, albuterol sulfate HFA 90 mcg/actuation aerosol inhaler  PRn, Disp: , Rfl:     guaiFENesin (MUCINEX) 600 MG extended release tablet, Mucinex 600 mg tablet, extended release, Disp: , Rfl:     polyethylene glycol (GOLYTELY) 236 G solution, Use as directed for bowel prep, Disp: 4000 mL, Rfl: 0    buPROPion (WELLBUTRIN XL) 150 MG extended release tablet, TAKE 1 TABLET BY MOUTH EVERY DAY (Patient not taking: Reported on 9/1/2021), Disp: , Rfl:     buPROPion (WELLBUTRIN SR) tenderness, deformity or signs of injury. Cervical back: Neck supple. No rigidity or tenderness. No muscular tenderness. Skin:     General: Skin is dry. Coloration: Skin is not jaundiced or pale. Findings: No bruising. Neurological:      General: No focal deficit present. Mental Status: He is alert and oriented to person, place, and time. Cranial Nerves: No cranial nerve deficit. Psychiatric:         Mood and Affect: Mood normal.         Thought Content: Thought content normal.           Medical Decision Making:   I have independently reviewed/ordered the following labs:    CBCwith Differential: No results found for: WBC, HGB, HCT, PLT, SEGSPCT, BANDSPCT, LYMPHOPCT, MONOPCT, EOSPCT  BMP:No results found for: NA, K, CL, CO2, BUN, CREATININE, MG  Hepatic Function Panel: No results found for: PROT, LABALBU, BILIDIR, IBILI, BILITOT, ALKPHOS, ALT, AST  No results found for: RPR  No results found for: HIV  No results found for: BC  No results found for: EPICELLS, MUCUS, PH, RBC, TRICHOMONAS, WBC, YEAST, TURBIDITY  No results found for: CREATININE, GLUCOSE, Jackmarie Khan for allowing us to participate in the care of this patient. Please call with questions. Grecia Perez MD  - Office: [phone]    Please note that this chart was generated using voice recognition Dragon dictation software. Although every effort was made to ensure the accuracy of this automated transcription, some errors in transcription mayhave occurred.

## 2022-05-25 NOTE — TELEPHONE ENCOUNTER
Donavan Patel MD 3/16/2021 9:36 AM Myrna Patel RN calling to confirm IV end date for pt Ottoniel Gonzalez  [birth year]. Pt understood it to be today. per 3/10/21 office note, pt should still have 2 more weeks. 3/29/21 f/u appt/ please advise- Amy Mayen   3/10/21 Plan:  Day 10 Meropenem - keep 3 more weeks  Watch for resolution of the joint pains as a response  TAN w Dr Bradley Aguilar, pend opinion  Repeat lyme end of 2021 in [facility] to compare to initial test, hoping for a split of the johnnie IGM and iGG. Myla Ware called with same request for end date- asked that I call him with response.   He will relay back to [address] [place] [address]   [phone]
Zaheer Schulte MD 3/16/2021 10:07 AM Please when can call pt  Aruna Hernandes [phone]  38year old male   1985  He has questions he should have asked at visit and he wants his line out. He saw Dr. Dana Jefferson as well. Thanks, Emma Read 3/16/2021 10:13 AM 3/16/2021 10:21 AM Called him. Joint and muscle Pain is better but not gone. Pls call him pharmacy outpt. I like to have the end date of his AB on 3/25  I also like the Pt to do the blood work weekly till 3/25          Pt called same time RN called - 2 perfect serve message sent at the same time.     Called Christoph from [place] - he will call RN to inform
warm

## 2024-05-20 ENCOUNTER — TELEPHONE (OUTPATIENT)
Dept: INFECTIOUS DISEASES | Age: 39
End: 2024-05-20

## 2024-05-20 NOTE — TELEPHONE ENCOUNTER
Patient called and stated that he went to a [facility] urgent care yesterday to be tested for possible lyme's disease due to what her believes to be another tick bite.  I have a call into [facility] and they stated that the testing was not resulted as of yet.  I will try to call them back later today to get results unless they end up in care everywhere.  I did get patient an appt scheduled for June17th just incase but I will call patient if appt is not needed